# Patient Record
Sex: MALE | Race: WHITE | NOT HISPANIC OR LATINO | Employment: FULL TIME | URBAN - METROPOLITAN AREA
[De-identification: names, ages, dates, MRNs, and addresses within clinical notes are randomized per-mention and may not be internally consistent; named-entity substitution may affect disease eponyms.]

---

## 2019-08-01 ENCOUNTER — APPOINTMENT (OUTPATIENT)
Dept: URGENT CARE | Facility: MEDICAL CENTER | Age: 24
End: 2019-08-01
Payer: OTHER MISCELLANEOUS

## 2019-08-01 PROCEDURE — G0382 LEV 3 HOSP TYPE B ED VISIT: HCPCS | Performed by: FAMILY MEDICINE

## 2019-08-01 PROCEDURE — 99283 EMERGENCY DEPT VISIT LOW MDM: CPT | Performed by: FAMILY MEDICINE

## 2019-08-02 ENCOUNTER — APPOINTMENT (OUTPATIENT)
Dept: URGENT CARE | Facility: MEDICAL CENTER | Age: 24
End: 2019-08-02
Payer: OTHER MISCELLANEOUS

## 2019-08-02 PROCEDURE — 99214 OFFICE O/P EST MOD 30 MIN: CPT | Performed by: FAMILY MEDICINE

## 2019-11-20 ENCOUNTER — OFFICE VISIT (OUTPATIENT)
Dept: URGENT CARE | Facility: MEDICAL CENTER | Age: 24
End: 2019-11-20
Payer: COMMERCIAL

## 2019-11-20 VITALS
RESPIRATION RATE: 18 BRPM | DIASTOLIC BLOOD PRESSURE: 81 MMHG | BODY MASS INDEX: 22.32 KG/M2 | TEMPERATURE: 97.8 F | HEART RATE: 82 BPM | SYSTOLIC BLOOD PRESSURE: 134 MMHG | OXYGEN SATURATION: 98 % | HEIGHT: 74 IN | WEIGHT: 173.94 LBS

## 2019-11-20 DIAGNOSIS — B34.9 VIRAL ILLNESS: ICD-10-CM

## 2019-11-20 DIAGNOSIS — R07.9 CHEST PAIN, UNSPECIFIED TYPE: Primary | ICD-10-CM

## 2019-11-20 PROCEDURE — 99283 EMERGENCY DEPT VISIT LOW MDM: CPT | Performed by: PHYSICIAN ASSISTANT

## 2019-11-20 PROCEDURE — G0382 LEV 3 HOSP TYPE B ED VISIT: HCPCS | Performed by: PHYSICIAN ASSISTANT

## 2019-11-20 NOTE — PROGRESS NOTES
[facility]        NAME: Tiesha Barnes is a 27 y o  male  : 1995    MRN: [id number]  DATE: 2019  TIME: 3:04 PM    Assessment and Plan   Chest pain, unspecified type [R07 9]  1  Chest pain, unspecified type  Transfer to other facility   2  Viral illness       Patient Instructions     Pt to ED for further evaluation    Chief Complaint     Chief Complaint   Patient presents with    Cold Like Symptoms     Patient here with sinus congestion and a cough for 5 days  He reports chest pain of a "6" off and on for 5 days  History of Present Illness       Chest Pain    This is a new problem  Episode onset: 5 days  The onset quality is sudden  The problem occurs constantly  The problem has been gradually worsening  The pain is present in the substernal region (left sided)  The pain is at a severity of 6/10  The quality of the pain is described as sharp  Associated symptoms include a cough, diaphoresis, irregular heartbeat, shortness of breath and weakness  Pertinent negatives include no abdominal pain, leg pain, lower extremity edema or syncope  Associated symptoms comments: Sinus congestion; chest pain preceded cold sxs  Risk factors include male gender  Pertinent negatives for past medical history include no COPD and no PE  Pertinent negatives for family medical history include: no heart disease  Review of Systems   Review of Systems   Constitutional: Positive for diaphoresis  Respiratory: Positive for cough and shortness of breath  Cardiovascular: Positive for chest pain  Negative for syncope  Gastrointestinal: Negative for abdominal pain  Neurological: Positive for weakness  Current Medications     No current outpatient medications on file      Current Allergies     Allergies as of 2019    (No Known Allergies)            The following portions of the patient's history were reviewed and updated as appropriate: allergies, current medications, past family history, past medical history, past social history, past surgical history and problem list      No past medical history on file  No past surgical history on file  No family history on file  Medications have been verified  Objective   /81 (BP Location: Left arm, Patient Position: Sitting, Cuff Size: Standard)   Pulse 82   Temp 97 8 °F (36 6 °C) (Tympanic)   Resp 18   Ht 6' 2" (1 88 m)   Wt 78 9 kg (173 lb 15 1 oz)   SpO2 98%   BMI 22 33 kg/m²     Physical Exam     Physical Exam   HENT:   Head: Normocephalic  Right Ear: External ear normal    Left Ear: External ear normal    Nose: Mucosal edema and rhinorrhea (CLEAR) present  Right sinus exhibits no maxillary sinus tenderness  Left sinus exhibits no maxillary sinus tenderness  Mouth/Throat: Oropharynx is clear and moist    Cardiovascular: Normal rate and regular rhythm  Exam reveals no gallop and no friction rub  No murmur heard  Pulmonary/Chest: Effort normal and breath sounds normal  No stridor  No respiratory distress  He has no decreased breath sounds  He has no wheezes  He has no rales  Denies chest wall TTP   Abdominal: Soft  Bowel sounds are normal  He exhibits no distension and no mass  There is no tenderness  There is no guarding